# Patient Record
Sex: MALE | Race: WHITE
[De-identification: names, ages, dates, MRNs, and addresses within clinical notes are randomized per-mention and may not be internally consistent; named-entity substitution may affect disease eponyms.]

---

## 2018-09-01 ENCOUNTER — HOSPITAL ENCOUNTER (EMERGENCY)
Dept: HOSPITAL 25 - UCEAST | Age: 33
Discharge: HOME | End: 2018-09-01
Payer: COMMERCIAL

## 2018-09-01 VITALS — SYSTOLIC BLOOD PRESSURE: 137 MMHG | DIASTOLIC BLOOD PRESSURE: 94 MMHG

## 2018-09-01 DIAGNOSIS — K12.0: ICD-10-CM

## 2018-09-01 DIAGNOSIS — R03.0: ICD-10-CM

## 2018-09-01 DIAGNOSIS — J02.9: Primary | ICD-10-CM

## 2018-09-01 PROCEDURE — G0463 HOSPITAL OUTPT CLINIC VISIT: HCPCS

## 2018-09-01 PROCEDURE — 87651 STREP A DNA AMP PROBE: CPT

## 2018-09-01 PROCEDURE — 99202 OFFICE O/P NEW SF 15 MIN: CPT

## 2018-09-01 NOTE — UC
Throat Pain/Nasal Timbo HPI





- HPI Summary


HPI Summary: 





31 y/o male presents to the urgent care c/o sore throat and B/L  ear pain for 

the past week. Pain w/ swallowing is 3/10, but it became worse yesterday since 

he has a mouth sore in the back of his throat. Symptoms are also associated w/ 

mild nasal congestion w/ clear nasal discharge and mild HA. Pt has taken 

Ibuprofen PO to alleviate symptoms last night. Pt denies fever, cough, SOB, 

chest pain, abdominal pain, N/V/D, 





- History of Current Complaint


Chief Complaint: UCGeneralIllness


Stated Complaint: SORE THROAT


Time Seen by Provider: 09/01/18 10:43


Hx Obtained From: Patient


Onset/Duration: Gradual Onset, Lasting Weeks - 1 week, Still Present, Worse 

Since - yesterday


Severity: Mild


Pain Intensity: 3


Pain Scale Used: 0-10 Numeric


Cough: None


Associated Signs & Symptoms: Positive: Dysphagia, Nasal Discharge, Other - B/L 

ear pain.  Negative: Fever





- Epiglottits Risk Factors


Epiglottis Risk Factors: Negative





- Allergies/Home Medications


Allergies/Adverse Reactions: 


 Allergies











Allergy/AdvReac Type Severity Reaction Status Date / Time


 


No Known Allergies Allergy   Verified 09/01/18 10:22











Home Medications: 


 Home Medications





Vit D3/Folic Acid/B2/B6/B12 [Folgard Tablet] 1 tab PO DAILY 09/01/18 [History 

Confirmed 09/01/18]











PMH/Surg Hx/FS Hx/Imm Hx


Previously Healthy: Yes


Other Endocrine History: Vitamin D defficiency





- Surgical History


Surgical History: None





- Family History


Known Family History: Positive: Cardiac Disease


Family History: MS





- Social History


Occupation: Employed Full-time


Lives: With Family


Alcohol Use: Occasionally


Substance Use Type: None


Smoking Status (MU): Never Smoked Tobacco





Review of Systems


Constitutional: Negative


Skin: Negative


ENT: Sore Throat, Ear Ache - B/L ear pain


Respiratory: Negative


Cardiovascular: Negative


Gastrointestinal: Negative


Genitourinary: Negative


Motor: Negative


Neurovascular: Negative


Musculoskeletal: Negative


Neurological: Headache


Psychological: Negative


Is Patient Immunocompromised?: No


All Other Systems Reviewed And Are Negative: Yes





Physical Exam





- Summary


Physical Exam Summary: 





VITAL SIGNS: Reviewed. 


GENERAL: Patient is a well developed and nourished male  who is sitting 

comfortable in the examining table. Patient is not in any acute respiratory 

distress. 


HEAD AND FACE: No signs of trauma. No ecchymosis, hematomas or skull 

depressions. No sinus tenderness. 


EYES: PERRLA, EOMI x 2, No injected conjunctiva, no nystagmus. No photophobia.


EARS: Hearing grossly intact. Ear canals and tympanic membranes are within 

normal limits. 


MOUTH: Positive pharynx with erythema, no exudates, palatal petechiae positive 

aphthous ulcer on the RT side of upper palate.  Negative B/L tonsillar 

enlargement with exudate. Uvula in midline. 


NECK: Supple, trachea is midline, Positive anterior cervical lymphadenopathy, 

no JVD, no carotid bruit, no c-spine tenderness, neck with full ROM. No 

meningeal signs, no Kernig's or brudzinskis signs. 


CHEST: Symmetric, no tenderness at palpation 


LUNGS: Clear to auscultation bilaterally. No wheezing or crackles.


CVS: Regular rate and rhythm, S1 and S2 present, no murmurs or gallops 

appreciated. 


ABDOMEN: Soft, non-tender. No signs of distention. No rebound no guarding, and 

no masses palpated. Bowel sounds are normal. 


EXTREMITIES: FROM in all major joints, no edema, no cyanosis or clubbing.


NEURO: Alert and oriented x 3. No acute neurological deficits. Speech is normal 

and follows commands. 


SKIN: Dry and warm 














Triage Information Reviewed: Yes


Vital Signs: 


 Initial Vital Signs











Temp  97 F   09/01/18 10:18


 


Pulse  78   09/01/18 10:18


 


Resp  16   09/01/18 10:18


 


BP  137/94   09/01/18 10:18


 


Pulse Ox  98   09/01/18 10:18














Throat Pain/Nasal Course/Dx





- Course


Course Of Treatment: 31 y/o male presents to the urgent care c/o sore throat 

and B/L  ear pain for the past week. Pain w/ swallowing is 3/10, but it became 

worse yesterday since he has a mouth sore in the back of his throat. Symptoms 

are also associated w/ mild nasal congestion w/ clear nasal discharge and mild 

HA. Pt has taken Ibuprofen PO to alleviate symptoms last night. Pt denies fever

, cough, SOB, chest pain, abdominal pain, N/V/D. Hx obtained. Pt w/ pharyngitis 

and apthous ulcer on examination. Pt Rx ibuprofen PO to alleviates symptoms of 

pain and swelling. Advised on hand washing to avoid spreading. Pt advised to 

rest, eat well and avoid strenuous exercise. If symptoms do not improve or 

worsen advised to return to the urgent care or f/u with her PCP for further 

evaluation and treatment.  Pt's BP is elevated today advised to decrease salt 

in diet, monitor BP and f/u with PCP for further management.  Pt understood and 

agreed w/ plan of care.





- Differential Dx/Diagnosis


Differential Diagnosis/HQI/PQRI: Laryngitis, Mononucleosis, Otitis Media, 

Pharyngitis, Sinusitis, Tonsillitis, URI


Provider Diagnoses: 1- Pharyngitis.  2- Aphthous ulcer.  3- Elevated BP w/o Hx 

of HTN





Discharge





- Sign-Out/Discharge


Documenting (check all that apply): Patient Departure - D/c home


All imaging exams completed and their final reports reviewed: No Studies





- Discharge Plan


Condition: Stable


Disposition: HOME


Prescriptions: 


Ibuprofen TAB* [Motrin TAB* 600 MG] 600 mg PO Q6H PRN #30 tab


 PRN Reason: Pain


Patient Education Materials:  Pharyngitis (ED), Canker Sores (ED)


Referrals: 


[facility] – [place], [place] PHYSICIAN REFERRAL [Outside] - 3 Days


()


Additional Instructions: 


1-Please take ibuprofen PO q6-8hrs prn as instructed after meals to alleviate 

pain and swelling. Increase fluid intake, eat well, rest and avoid strenuous 

exercise


2-If symptoms do not improve or worsen please return to the urgent care or f/u 

with your PCP for further evaluation and treatment.


3-Your BP is elevated today. please decrease salt in your diet, monitor BP and 

if it continues to be elevated please f/u with your PCP for further management

















- Billing Disposition and Condition


Condition: STABLE


Disposition: Home

## 2019-10-24 ENCOUNTER — HOSPITAL ENCOUNTER (EMERGENCY)
Dept: HOSPITAL 25 - ED | Age: 34
Discharge: HOME | End: 2019-10-24
Payer: COMMERCIAL

## 2019-10-24 VITALS — DIASTOLIC BLOOD PRESSURE: 92 MMHG | SYSTOLIC BLOOD PRESSURE: 135 MMHG

## 2019-10-24 DIAGNOSIS — R11.0: ICD-10-CM

## 2019-10-24 DIAGNOSIS — R10.31: Primary | ICD-10-CM

## 2019-10-24 LAB
ALBUMIN SERPL BCG-MCNC: 4.5 G/DL (ref 3.2–5.2)
ALBUMIN/GLOB SERPL: 1.8 {RATIO} (ref 1–3)
ALP SERPL-CCNC: 96 U/L (ref 34–104)
ALT SERPL W P-5'-P-CCNC: 22 U/L (ref 7–52)
ANION GAP SERPL CALC-SCNC: 6 MMOL/L (ref 2–11)
AST SERPL-CCNC: 15 U/L (ref 13–39)
BASOPHILS # BLD AUTO: 0.1 10^3/UL (ref 0–0.2)
BUN SERPL-MCNC: 16 MG/DL (ref 6–24)
BUN/CREAT SERPL: 15 (ref 8–20)
CALCIUM SERPL-MCNC: 9.1 MG/DL (ref 8.6–10.3)
CHLORIDE SERPL-SCNC: 106 MMOL/L (ref 101–111)
EOSINOPHIL # BLD AUTO: 0.1 10^3/UL (ref 0–0.6)
GLOBULIN SER CALC-MCNC: 2.5 G/DL (ref 2–4)
GLUCOSE SERPL-MCNC: 89 MG/DL (ref 70–100)
HCO3 SERPL-SCNC: 27 MMOL/L (ref 22–32)
HCT VFR BLD AUTO: 45 % (ref 42–52)
HGB BLD-MCNC: 15.4 G/DL (ref 14–18)
LYMPHOCYTES # BLD AUTO: 1.3 10^3/UL (ref 1–4.8)
MCH RBC QN AUTO: 29 PG (ref 27–31)
MCHC RBC AUTO-ENTMCNC: 34 G/DL (ref 31–36)
MCV RBC AUTO: 85 FL (ref 80–94)
MONOCYTES # BLD AUTO: 0.6 10^3/UL (ref 0–0.8)
NEUTROPHILS # BLD AUTO: 4.5 10^3/UL (ref 1.5–7.7)
NRBC # BLD AUTO: 0 10^3/UL
NRBC BLD QL AUTO: 0.1
PLATELET # BLD AUTO: 210 10^3/UL (ref 150–450)
POTASSIUM SERPL-SCNC: 3.9 MMOL/L (ref 3.5–5)
PROT SERPL-MCNC: 7 G/DL (ref 6.4–8.9)
RBC # BLD AUTO: 5.3 10^6 /UL (ref 4.18–5.48)
SODIUM SERPL-SCNC: 139 MMOL/L (ref 135–145)
WBC # BLD AUTO: 6.6 10^3/UL (ref 3.5–10.8)

## 2019-10-24 PROCEDURE — 80053 COMPREHEN METABOLIC PANEL: CPT

## 2019-10-24 PROCEDURE — 99282 EMERGENCY DEPT VISIT SF MDM: CPT

## 2019-10-24 PROCEDURE — 85025 COMPLETE CBC W/AUTO DIFF WBC: CPT

## 2019-10-24 PROCEDURE — 81003 URINALYSIS AUTO W/O SCOPE: CPT

## 2019-10-24 PROCEDURE — 86140 C-REACTIVE PROTEIN: CPT

## 2019-10-24 PROCEDURE — 36415 COLL VENOUS BLD VENIPUNCTURE: CPT

## 2019-10-24 NOTE — ED
Abdominal Pain/Male





- HPI Summary


HPI Summary: 





This pt is a 33 y/o male, accompanied by girlfriend, [facility] 

right lower quadrant abd pain x6 days. Pt reports his abd pain has been 

intermittent for the past 6 days but today it has been mostly constant. He 

states he left work early today due to pain. Pt notes he was a little nauseous 

today but it has resolved since then. Denies fever, chills, vomiting, diarrhea. 

He reports he was constipated a couple of days ago but took laxatives and it 

resolved. Denies dysuria, hematuria, testicular pain. He reports he has never 

felt this pain in the past, however every once in a while he will get a dull 

pain. 


He notes in March of 2019 he had a similar pain and went to another hospital 

where he was told he might have gallstones. He had an ultrasound there but 

never got results.


Pt additionally states lately in the evenings he has been experiencing 

lightheadedness and heart racing that occur usually when sitting. He has never 

felt this in the past. 


Denies PMHx or PSHx. Denies tobacco, drug, or alcohol use. 


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





Medications reviewed. Allergies noted. 





- History of Current Complaint


Chief Complaint: EDAbdPain


Stated Complaint: RIGHT ABD PAIN


Time Seen by Provider: 10/24/19 15:22


Hx Obtained From: Patient


Onset/Duration: Lasting Days - 6, Still Present


Timing: Lasting Days - 6


Severity Currently: Mild


Pain Intensity: 3


Pain Scale Used: 0-10 Numeric


Location: Discrete At: RLQ


Radiates: No


Aggravating Factor(s): Nothing


Alleviating Factor(s): Nothing


Associated Signs And Symptoms: Positive: Nausea.  Negative: Fever, Urinary 

Symptoms, Vomiting, Diarrhea





- Allergies/Home Medications


Allergies/Adverse Reactions: 


 Allergies











Allergy/AdvReac Type Severity Reaction Status Date / Time


 


No Known Allergies Allergy   Verified 10/24/19 15:19














PMH/Surg Hx/FS Hx/Imm Hx


Endocrine/Hematology History: 


   Denies: Hx Diabetes


Cardiovascular History: 


   Denies: Hx Hypertension





- Surgical History


Surgical History: None


Infectious Disease History: No


Infectious Disease History: 


   Denies: Traveled Outside the [place] in Last 30 Days





- Family History


Known Family History: Positive: Cardiac Disease


Family History: MS





- Social History


Alcohol Use: Occasionally


Substance Use Type: Reports: None


Smoking Status (MU): Never Smoked Tobacco





Review of Systems


Negative: Fever, Chills


Positive: Abdominal Pain, Nausea.  Negative: Vomiting, Diarrhea


Negative: dysuria, hematuria, pain - testicular


All Other Systems Reviewed And Are Negative: Yes





Physical Exam





- Summary


Physical Exam Summary: 





Constitutional: Well-developed, Well-nourished, Alert. (-) Distressed


Skin: Warm, Dry


HENT: Normocephalic; Atraumatic


Eyes: Conjunctiva normal


Neck: Musculoskeletal ROM normal neck. (-) JVD, (-) Stridor, (-) Tracheal 

deviation


Cardio: Rhythm regular, rate normal, Heart sounds normal; Intact distal pulses; 

The pedal pulses are 2+ and symmetric. Radial pulses are 2+ and symmetric. (-) 

Murmur


Pulmonary/Chest wall: Effort normal. (-) Respiratory distress, (-) Wheezes, (-) 

Rales


Abd: Soft, Tenderness with deep palpation in the right lower quadrant. No 

rebound or guarding. Negative McBurney's sign. Negative Borrego's sign. No pain 

with heel tap.  (-) Distension


Musculoskeletal: (-) Edema


Lymph: (-) Cervical adenopathy


Neuro: Alert, Oriented x3


Psych: Mood and affect Normal


Triage Information Reviewed: Yes


Vital Signs On Initial Exam: 


 Initial Vitals











Temp Pulse Resp BP Pulse Ox


 


 97.7 F   97   18   143/113   99 


 


 10/24/19 15:17  10/24/19 15:17  10/24/19 15:17  10/24/19 15:17  10/24/19 15:17











Vital Signs Reviewed: Yes





Procedures





- Sedation


Patient Received Moderate/Deep Sedation with Procedure: No





- Ultrasound


  ** No standard instances


Ultrasound: normal - Bedside ultrasound shows normal gallbladder with no 

gallstones





Diagnostics





- Vital Signs


 Vital Signs











  Temp Pulse Resp BP Pulse Ox


 


 10/24/19 15:17  97.7 F  97  18  143/113  99














- Laboratory


Result Diagrams: 


 10/24/19 15:32





 10/24/19 15:32


Lab Statement: Any lab studies that have been ordered have been reviewed, and 

results considered in the medical decision making process.





Re-Evaluation





- Re-Evaluation


  ** First Eval


Re-Evaluation Time: 15:48


Comment: Bedside ultrasound performed.





Abdominal Pain Male Course/Dx





- Course


Course Of Treatment: Patient is here with 6 days of off and on right lower 

quadrant pain with no other symptoms.  Patient has no pain in his testicles or 

signs of infection.  Patient is an overall well-appearing with a benign 

abdominal exam.  Patient a CBC was admitted leukocytosis.  Patient had normal 

CRP.  Patient negative UA for hematuria.  I do not believe patient's symptoms 

and exam are consistent with appendicitis and he does not need a CT scan this 

point.  Patient was encouraged hopeless PCP and return if the symptoms are 

worsening.





- Diagnoses


Provider Diagnoses: 


 RLQ abdominal pain








Discharge ED





- Sign-Out/Discharge


Documenting (check all that apply): Patient Departure - Discharge home





- Discharge Plan


Condition: Stable


Disposition: HOME


Patient Education Materials:  Abdominal Pain (ED)


Referrals: 


No Primary Care Phys,NOPCP [Primary Care Provider] - 


[facility] [Outside]


Additional Instructions: 


PLEASE RETURN TO EMERGENCY DEPARTMENT FOR SEVERE PAIN, FEVER, VOMITING, OR ANY 

OTHER CONCERNING SYMPTOMS.





Please follow up with your primary care physician. Please make all follow-ups 

in 1-3 days unless I advise you otherwise.





- Billing Disposition and Condition


Condition: STABLE


Disposition: Home





- Attestation Statements


Document Initiated by Rafael: Yes


Documenting Scribe: Liudmila Real


Provider For Whom Rafael is Documenting (Include Credential): Hayden Lu MD


Scribe Attestation: 


ILiudmila, scribed for Hayden Lu MD on 10/24/19 at 1710. 


Scribe Documentation Reviewed: Yes


Provider Attestation: 


The documentation as recorded by the Liudmila birch accurately reflects 

the service I personally performed and the decisions made by me, Hayden Lu MD


Status of Scribe Document: Viewed